# Patient Record
(demographics unavailable — no encounter records)

---

## 2024-10-11 NOTE — DISCUSSION/SUMMARY
[FreeTextEntry1] : The visit was provided via telehealth using real-time 2-way audio visual technology. The patient, Candy Ivan, was located in [place], [place] at the time of the visit. The provider, Janae Ornelas (genetic counselor) participated in the telehealth encounter. The genetic counselor was located in [place], [place] at the time of the appointment. Verbal consent for telehealth services was given by the patient. The patient was unaccompanied.  REASON FOR VISIT: Ms. Candy Ivan is a 28-year-old female who was referred by Dr. Chris Romero for cancer genetic counseling and risk assessment due to a family history of cancer. The patient was seen on 10/9/2024 through [facility]. The patient was unaccompanied.   RELEVANT MEDICAL AND SURGICAL HISTORY: The patient reported no personal history of cancer.   OTHER MEDICAL AND SURGICAL HISTORY: - Depression  - Anxiety  - Anemia - Hernia repair    PAST OB/GYN HISTORY: Obstetrical History:  Age at Menarche: 10 Pre-Menopausal Age at First Live Birth: N/A Oral Contraceptive Use: >5 years Hormone Replacement Therapy:    CANCER SCREENING HISTORY: Breast: No imaging. Annual clinical breast exams. GYN: Patient reported heavy menses when not on OCP. Colon: Last colonoscopy 2022, 1 hyperplastic polyp removed. Frequency: 5 years. Skin: Denied    SOCIAL HISTORY:  Tobacco-product use: Denied    FAMILY HISTORY: Paternal ancestry was reported as [place] and maternal ancestry was reported as [place]. A detailed family history of cancer was ascertained, see below for pedigree. Of note: - Father with colon cancer in his early 40s and prostate cancer in his early 40s - Mother with kidney cancer in her early 40s    The remaining family history is unremarkable. According to the patient there are no other known cases of significant cancers in the family. To her knowledge no one else in the family has had germline testing for cancer susceptibility.   RISK ASSESSMENT: Ms. Ivan's family history of cancer is suggestive of a hereditary cancer susceptibility syndrome. Variants in colon and kidney cancer susceptibility genes were of specific concern.   We discussed the risks, benefits and limitations, financial cost and implications of genetic testing. We also discussed the psychosocial implications of genetic testing. Possible test results were reviewed with the patient, along with associated medical management options. The Genetic Information Non-discrimination Act (MARILYN) was also reviewed.   The patient opted to pursue testing. We discussed the option of sending a saliva kit to her home or she can come into the clinic to give a sample in the clinic. The patient opted to come into the clinic on 10/16/24 to give a sample.   PLAN: 1. The patient opted to pursue testing. We discussed the option of sending a saliva kit to her home or she can come into the clinic to give a sample in the clinic. The patient opted to come into the clinic on 10/16/24 to give a sample.  For any additional questions please call Cancer Genetics at [phone] or [phone].     Janae Ornelas MS, [facility] Genetic Counselor, Cancer Genetics

## 2024-10-11 NOTE — DISCUSSION/SUMMARY
Report given to Ayala   [FreeTextEntry1] : The visit was provided via telehealth using real-time 2-way audio visual technology. The patient, Candy Ivan, was located in [place], [place] at the time of the visit. The provider, Janae Ornelas (genetic counselor) participated in the telehealth encounter. The genetic counselor was located in [place], [place] at the time of the appointment. Verbal consent for telehealth services was given by the patient. The patient was unaccompanied.  REASON FOR VISIT: Ms. Candy Ivan is a 28-year-old female who was referred by Dr. Chris Romero for cancer genetic counseling and risk assessment due to a family history of cancer. The patient was seen on 10/9/2024 through [facility]. The patient was unaccompanied.   RELEVANT MEDICAL AND SURGICAL HISTORY: The patient reported no personal history of cancer.   OTHER MEDICAL AND SURGICAL HISTORY: - Depression  - Anxiety  - Anemia - Hernia repair    PAST OB/GYN HISTORY: Obstetrical History:  Age at Menarche: 10 Pre-Menopausal Age at First Live Birth: N/A Oral Contraceptive Use: >5 years Hormone Replacement Therapy:    CANCER SCREENING HISTORY: Breast: No imaging. Annual clinical breast exams. GYN: Patient reported heavy menses when not on OCP. Colon: Last colonoscopy 2022, 1 hyperplastic polyp removed. Frequency: 5 years. Skin: Denied    SOCIAL HISTORY:  Tobacco-product use: Denied    FAMILY HISTORY: Paternal ancestry was reported as English and maternal ancestry was reported as English. A detailed family history of cancer was ascertained, see below for pedigree. Of note: - Father with colon cancer in his early 40s and prostate cancer in his early 40s - Mother with kidney cancer in her early 40s    The remaining family history is unremarkable. According to the patient there are no other known cases of significant cancers in the family. To her knowledge no one else in the family has had germline testing for cancer susceptibility.   RISK ASSESSMENT: Ms. Ivan's family history of cancer is suggestive of a hereditary cancer susceptibility syndrome. Variants in colon and kidney cancer susceptibility genes were of specific concern.   We discussed the risks, benefits and limitations, financial cost and implications of genetic testing. We also discussed the psychosocial implications of genetic testing. Possible test results were reviewed with the patient, along with associated medical management options. The Genetic Information Non-discrimination Act (MARILYN) was also reviewed.   The patient opted to pursue testing. We discussed the option of sending a saliva kit to her home or she can come into the clinic to give a sample in the clinic. The patient opted to come into the clinic on 10/16/24 to give a sample.   PLAN: 1. The patient opted to pursue testing. We discussed the option of sending a saliva kit to her home or she can come into the clinic to give a sample in the clinic. The patient opted to come into the clinic on 10/16/24 to give a sample.  For any additional questions please call Cancer Genetics at [phone] or [phone].     Janae Ornelas MS, [facility] Genetic Counselor, Cancer Genetics

## 2024-11-25 NOTE — DISCUSSION/SUMMARY
[FreeTextEntry1] : REASON FOR VISIT: Ms. Candy Ivan is a 28-year-old female who was called on 2024 for a discussion regarding her negative genetic test results related to hereditary cancer predisposition.   RELEVANT MEDICAL AND SURGICAL HISTORY: The patient reported no personal history of cancer.  OTHER MEDICAL AND SURGICAL HISTORY: - Depression - Anxiety - Anemia - Hernia repair  PAST OB/GYN HISTORY: Obstetrical History:  Age at Menarche: 10 Pre-Menopausal Age at First Live Birth: N/A Oral Contraceptive Use: >5 years Hormone Replacement Therapy:  CANCER SCREENING HISTORY: Breast: No imaging. Annual clinical breast exams. GYN: Patient reported heavy menses when not on OCP. Colon: Last colonoscopy 2022, 1 hyperplastic polyp removed. Frequency: 5 years. Skin: Denied  SOCIAL HISTORY:  Tobacco-product use: Denied  FAMILY HISTORY: Paternal ancestry was reported as [place]ian and maternal ancestry was reported as [place]ian. A detailed family history of cancer was ascertained, see below for pedigree. Of note: - Father with colon cancer in his early 40s and prostate cancer in his early 40s - Mother with kidney cancer in her early 40s  The remaining family history is unremarkable. According to the patient there are no other known cases of significant cancers in the family. To her knowledge no one else in the family has had germline testing for cancer susceptibility.   TEST RESULTS: NEGATIVE   NO pathogenic (disease-causing) variants or variants of uncertain significance were detected in the following genes: : APC, EVELYN, BAP1, BMPR1A, BRCA1, BRCA2, CDH1, CHEK2, FH, FLCN, MET, MLH1, MSH2, MSH6, MUTYH, NBN, NTHL1, PALB2, PMS2, PTEN, RAD51D, SDHA, SDHB, SDHC, SDHD, SMAD4, STK11, TP53, TSC1, TSC2 and VHL (sequencing and deletion/duplication); AXIN2, HOXB13, MITF, MSH3, POLD1 and POLE (sequencing only); EPCAM and GREM1 (deletion/duplication only).    RESULTS INTERPRETATION AND ASSESSMENT: Given Ms. Ivan's current reported personal and family history of cancer, and her negative genetic test results, the following screening guidelines and risk-reducing recommendations were discussed: Colorectal: Given Ms. Ivan's family history of CRC, current recommendations for first-degree relatives of an affected individual with CRC recommend starting colonoscopy at 40 years old or 10 years prior to the earliest CRC diagnosis. Frequency of colonoscopy at least once every 5 years, or sooner if clinically indicated. Other: In the absence of other indications, the patient should practice age-appropriate cancer screening for all other organ systems as recommended for the general population.   It is recommended that the patient discuss the importance of pursuing cancer genetic testing and counseling with her other relatives. There may be a pathogenic variant in the family which the patient did not inherit. We would be happy to meet with her family members or refer them to a genetic expert in their area.   We also discussed that, while no clear cause of the patient's family history of cancer was identified, this result, while reassuring, does not entirely rule out a hereditary cancer risk in the patient. It is possible the patient has a mutation in one of the genes tested that is not detectable by this analysis, or has a mutation in a different gene, either known or unknown. It is also possible there is a hereditary cancer predisposition in the family, but the patient did not inherit it.   Our knowledge of genetics and inherited cancer conditions is changing rapidly, therefore, we recommend that the patient contact our office, every 2 to 3 years, to discuss relevant advances in cancer genetics. We emphasize the importance of re-contacting us with updates regarding her personal and family history of cancer as well as any updates regarding additional cancer genetic test results performed for the patient and/or family members.  Such updates could possibly change our risk assessment and recommendations.   PLAN: 1. Long-term management and surveillance should be based on the patient's personal and family history and general population guidelines for all other cancers. 2. A copy of the genetic test results is available to the patient in the [employer] portal. 3. The patient was encouraged to contact us every 2-3 years to discuss relevant advances in cancer genetics, or sooner if there are any changes in her personal or family history of cancer.   For any additional questions please call Cancer Genetics at [phone] or [phone].   Janae Ornelas MS, [facility]  Genetic Counselor, Cancer Genetics